# Patient Record
Sex: FEMALE
[De-identification: names, ages, dates, MRNs, and addresses within clinical notes are randomized per-mention and may not be internally consistent; named-entity substitution may affect disease eponyms.]

---

## 2017-01-02 ENCOUNTER — RECORDS - HEALTHEAST (OUTPATIENT)
Dept: ADMINISTRATIVE | Facility: OTHER | Age: 37
End: 2017-01-02

## 2018-01-26 ENCOUNTER — OFFICE VISIT - HEALTHEAST (OUTPATIENT)
Dept: FAMILY MEDICINE | Facility: CLINIC | Age: 38
End: 2018-01-26

## 2018-01-26 DIAGNOSIS — M06.9 RHEUMATOID ARTHRITIS, ADULT (H): ICD-10-CM

## 2018-01-26 DIAGNOSIS — L50.2 URTICARIA DUE TO COLD: ICD-10-CM

## 2018-01-26 DIAGNOSIS — F43.10 PTSD (POST-TRAUMATIC STRESS DISORDER): ICD-10-CM

## 2018-01-26 DIAGNOSIS — E28.2 POLYCYSTIC OVARIES: ICD-10-CM

## 2018-01-26 DIAGNOSIS — F41.9 ANXIETY DISORDER: ICD-10-CM

## 2018-01-26 RX ORDER — ESCITALOPRAM OXALATE 10 MG/1
10 TABLET ORAL DAILY
Qty: 30 TABLET | Refills: 5 | Status: SHIPPED | OUTPATIENT
Start: 2018-01-26

## 2018-01-26 ASSESSMENT — MIFFLIN-ST. JEOR: SCORE: 1429.17

## 2019-12-03 ENCOUNTER — COMMUNICATION - HEALTHEAST (OUTPATIENT)
Dept: FAMILY MEDICINE | Facility: CLINIC | Age: 39
End: 2019-12-03

## 2019-12-03 DIAGNOSIS — F41.9 ANXIETY DISORDER: ICD-10-CM

## 2021-05-31 VITALS — WEIGHT: 221 LBS | BODY MASS INDEX: 43.39 KG/M2 | HEIGHT: 60 IN

## 2021-06-03 NOTE — TELEPHONE ENCOUNTER
Refill denied.  Last seen 24 months ago and no pcp.  Has not been filled in a year.    Kesha Gold, RN, [facility] Nurse Triage/Med Refills RN
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
## 2021-06-16 PROBLEM — F43.10 PTSD (POST-TRAUMATIC STRESS DISORDER): Status: ACTIVE | Noted: 2018-01-26

## 2021-06-16 PROBLEM — L50.2 URTICARIA DUE TO COLD: Status: ACTIVE | Noted: 2018-01-26

## 2021-06-16 PROBLEM — M06.9 RHEUMATOID ARTHRITIS, ADULT (H): Status: ACTIVE | Noted: 2018-01-26
